# Patient Record
Sex: FEMALE | Race: WHITE | Employment: FULL TIME | URBAN - METROPOLITAN AREA
[De-identification: names, ages, dates, MRNs, and addresses within clinical notes are randomized per-mention and may not be internally consistent; named-entity substitution may affect disease eponyms.]

---

## 2023-06-13 DIAGNOSIS — F51.01 PRIMARY INSOMNIA: ICD-10-CM

## 2023-06-13 RX ORDER — ESZOPICLONE 3 MG/1
TABLET, FILM COATED ORAL
Qty: 30 TABLET | Refills: 0 | Status: SHIPPED | OUTPATIENT
Start: 2023-06-13 | End: 2023-07-14

## 2023-07-27 ENCOUNTER — OFFICE VISIT (OUTPATIENT)
Dept: OCCUPATIONAL MEDICINE | Age: 28
End: 2023-07-27

## 2023-07-27 VITALS
TEMPERATURE: 98 F | HEART RATE: 71 BPM | OXYGEN SATURATION: 100 % | SYSTOLIC BLOOD PRESSURE: 118 MMHG | DIASTOLIC BLOOD PRESSURE: 76 MMHG

## 2023-07-27 DIAGNOSIS — J32.9 BACTERIAL SINUSITIS: Primary | ICD-10-CM

## 2023-07-27 DIAGNOSIS — B96.89 BACTERIAL SINUSITIS: Primary | ICD-10-CM

## 2023-07-27 RX ORDER — AMOXICILLIN AND CLAVULANATE POTASSIUM 875; 125 MG/1; MG/1
1 TABLET, FILM COATED ORAL 2 TIMES DAILY
Qty: 14 TABLET | Refills: 0 | Status: SHIPPED | OUTPATIENT
Start: 2023-07-27 | End: 2023-08-03

## 2023-07-27 ASSESSMENT — ENCOUNTER SYMPTOMS
RHINORRHEA: 1
SORE THROAT: 1
SHORTNESS OF BREATH: 0
SINUS PRESSURE: 1
COUGH: 1
SINUS PAIN: 1

## 2023-08-15 ENCOUNTER — TELEPHONE (OUTPATIENT)
Dept: PRIMARY CARE CLINIC | Facility: CLINIC | Age: 28
End: 2023-08-15

## 2023-08-15 DIAGNOSIS — E55.9 VITAMIN D DEFICIENCY: ICD-10-CM

## 2023-08-15 DIAGNOSIS — F51.01 PRIMARY INSOMNIA: ICD-10-CM

## 2023-08-15 DIAGNOSIS — Z13.220 SCREENING FOR LIPID DISORDERS: ICD-10-CM

## 2023-08-15 DIAGNOSIS — R73.9 HYPERGLYCEMIA: ICD-10-CM

## 2023-08-15 DIAGNOSIS — Z79.899 ENCOUNTER FOR LONG-TERM (CURRENT) USE OF MEDICATIONS: Primary | ICD-10-CM

## 2023-08-15 DIAGNOSIS — Z83.49 FAMILY HISTORY OF THYROID DISEASE: ICD-10-CM

## 2023-08-15 NOTE — TELEPHONE ENCOUNTER
Please send one month supply of Lunesta to [facility] in [place] she is out of medication but appointment scheduled for 09/18/2023. Patient is scheduled for labs please order. Patient would also like thyroid panel because she has family history of thyroid disease.

## 2023-08-16 DIAGNOSIS — F51.01 PRIMARY INSOMNIA: ICD-10-CM

## 2023-08-16 RX ORDER — ESZOPICLONE 3 MG/1
3 TABLET, FILM COATED ORAL NIGHTLY
Qty: 30 TABLET | Refills: 0 | Status: SHIPPED | OUTPATIENT
Start: 2023-08-16 | End: 2023-09-15

## 2023-08-16 RX ORDER — ESZOPICLONE 3 MG/1
TABLET, FILM COATED ORAL
Qty: 30 TABLET | Refills: 0 | OUTPATIENT
Start: 2023-08-16

## 2023-08-22 DIAGNOSIS — Z83.49 FAMILY HISTORY OF THYROID DISEASE: ICD-10-CM

## 2023-08-22 DIAGNOSIS — Z79.899 ENCOUNTER FOR LONG-TERM (CURRENT) USE OF MEDICATIONS: ICD-10-CM

## 2023-08-22 DIAGNOSIS — Z13.220 SCREENING FOR LIPID DISORDERS: ICD-10-CM

## 2023-08-22 DIAGNOSIS — E55.9 VITAMIN D DEFICIENCY: ICD-10-CM

## 2023-08-22 DIAGNOSIS — R73.9 HYPERGLYCEMIA: ICD-10-CM

## 2023-08-22 LAB
25(OH)D3 SERPL-MCNC: 40.2 NG/ML (ref 30–100)
ALBUMIN SERPL-MCNC: 4 G/DL (ref 3.5–5)
ALBUMIN/GLOB SERPL: 1.3 (ref 0.4–1.6)
ALP SERPL-CCNC: 31 U/L (ref 50–136)
ALT SERPL-CCNC: 19 U/L (ref 12–65)
ANION GAP SERPL CALC-SCNC: 8 MMOL/L (ref 2–11)
AST SERPL-CCNC: 14 U/L (ref 15–37)
BASOPHILS # BLD: 0 K/UL (ref 0–0.2)
BASOPHILS NFR BLD: 1 % (ref 0–2)
BILIRUB SERPL-MCNC: 0.6 MG/DL (ref 0.2–1.1)
BUN SERPL-MCNC: 10 MG/DL (ref 6–23)
CALCIUM SERPL-MCNC: 9.1 MG/DL (ref 8.3–10.4)
CHLORIDE SERPL-SCNC: 111 MMOL/L (ref 101–110)
CHOLEST SERPL-MCNC: 135 MG/DL
CO2 SERPL-SCNC: 23 MMOL/L (ref 21–32)
CREAT SERPL-MCNC: 0.9 MG/DL (ref 0.6–1)
DIFFERENTIAL METHOD BLD: ABNORMAL
EOSINOPHIL # BLD: 0 K/UL (ref 0–0.8)
EOSINOPHIL NFR BLD: 1 % (ref 0.5–7.8)
ERYTHROCYTE [DISTWIDTH] IN BLOOD BY AUTOMATED COUNT: 12.9 % (ref 11.9–14.6)
GLOBULIN SER CALC-MCNC: 3 G/DL (ref 2.8–4.5)
GLUCOSE SERPL-MCNC: 93 MG/DL (ref 65–100)
HCT VFR BLD AUTO: 38.1 % (ref 35.8–46.3)
HDLC SERPL-MCNC: 75 MG/DL (ref 40–60)
HDLC SERPL: 1.8
HGB BLD-MCNC: 12.1 G/DL (ref 11.7–15.4)
IMM GRANULOCYTES # BLD AUTO: 0 K/UL (ref 0–0.5)
IMM GRANULOCYTES NFR BLD AUTO: 0 % (ref 0–5)
LDLC SERPL CALC-MCNC: 55.2 MG/DL
LYMPHOCYTES # BLD: 1.5 K/UL (ref 0.5–4.6)
LYMPHOCYTES NFR BLD: 33 % (ref 13–44)
MCH RBC QN AUTO: 30.4 PG (ref 26.1–32.9)
MCHC RBC AUTO-ENTMCNC: 31.8 G/DL (ref 31.4–35)
MCV RBC AUTO: 95.7 FL (ref 82–102)
MONOCYTES # BLD: 0.4 K/UL (ref 0.1–1.3)
MONOCYTES NFR BLD: 8 % (ref 4–12)
NEUTS SEG # BLD: 2.7 K/UL (ref 1.7–8.2)
NEUTS SEG NFR BLD: 57 % (ref 43–78)
NRBC # BLD: 0 K/UL (ref 0–0.2)
PLATELET # BLD AUTO: 246 K/UL (ref 150–450)
PMV BLD AUTO: 11 FL (ref 9.4–12.3)
POTASSIUM SERPL-SCNC: 3.9 MMOL/L (ref 3.5–5.1)
PROT SERPL-MCNC: 7 G/DL (ref 6.3–8.2)
RBC # BLD AUTO: 3.98 M/UL (ref 4.05–5.2)
SODIUM SERPL-SCNC: 142 MMOL/L (ref 133–143)
TRIGL SERPL-MCNC: 24 MG/DL (ref 35–150)
TSH W FREE THYROID IF ABNORMAL: 2.24 UIU/ML (ref 0.36–3.74)
VLDLC SERPL CALC-MCNC: 4.8 MG/DL (ref 6–23)
WBC # BLD AUTO: 4.7 K/UL (ref 4.3–11.1)

## 2023-08-23 LAB
EST. AVERAGE GLUCOSE BLD GHB EST-MCNC: 97 MG/DL
HBA1C MFR BLD: 5 % (ref 4.8–5.6)

## 2023-09-14 DIAGNOSIS — F51.01 PRIMARY INSOMNIA: ICD-10-CM

## 2023-09-14 RX ORDER — ESZOPICLONE 3 MG/1
3 TABLET, FILM COATED ORAL NIGHTLY
Qty: 30 TABLET | Refills: 0 | Status: SHIPPED | OUTPATIENT
Start: 2023-09-14 | End: 2023-10-14

## 2023-09-14 RX ORDER — ESZOPICLONE 3 MG/1
3 TABLET, FILM COATED ORAL NIGHTLY
Qty: 30 TABLET | Refills: 0 | OUTPATIENT
Start: 2023-09-14 | End: 2023-10-14

## 2023-10-03 ENCOUNTER — OFFICE VISIT (OUTPATIENT)
Dept: PRIMARY CARE CLINIC | Facility: CLINIC | Age: 28
End: 2023-10-03
Payer: COMMERCIAL

## 2023-10-03 VITALS
HEART RATE: 61 BPM | DIASTOLIC BLOOD PRESSURE: 81 MMHG | HEIGHT: 65 IN | BODY MASS INDEX: 16.98 KG/M2 | WEIGHT: 101.9 LBS | OXYGEN SATURATION: 100 % | TEMPERATURE: 98.7 F | SYSTOLIC BLOOD PRESSURE: 126 MMHG

## 2023-10-03 DIAGNOSIS — F51.01 PRIMARY INSOMNIA: Primary | ICD-10-CM

## 2023-10-03 DIAGNOSIS — Z79.899 ENCOUNTER FOR LONG-TERM (CURRENT) USE OF MEDICATIONS: ICD-10-CM

## 2023-10-03 DIAGNOSIS — F90.2 ATTENTION DEFICIT HYPERACTIVITY DISORDER (ADHD), COMBINED TYPE: ICD-10-CM

## 2023-10-03 PROCEDURE — 99213 OFFICE O/P EST LOW 20 MIN: CPT | Performed by: FAMILY MEDICINE

## 2023-10-03 RX ORDER — ESZOPICLONE 3 MG/1
3 TABLET, FILM COATED ORAL NIGHTLY
Qty: 30 TABLET | Refills: 5 | Status: SHIPPED | OUTPATIENT
Start: 2023-10-03 | End: 2023-11-02

## 2023-10-03 SDOH — ECONOMIC STABILITY: FOOD INSECURITY: WITHIN THE PAST 12 MONTHS, THE FOOD YOU BOUGHT JUST DIDN'T LAST AND YOU DIDN'T HAVE MONEY TO GET MORE.: NEVER TRUE

## 2023-10-03 SDOH — ECONOMIC STABILITY: HOUSING INSECURITY
IN THE LAST 12 MONTHS, WAS THERE A TIME WHEN YOU DID NOT HAVE A STEADY PLACE TO SLEEP OR SLEPT IN A SHELTER (INCLUDING NOW)?: PATIENT REFUSED

## 2023-10-03 SDOH — ECONOMIC STABILITY: INCOME INSECURITY: HOW HARD IS IT FOR YOU TO PAY FOR THE VERY BASICS LIKE FOOD, HOUSING, MEDICAL CARE, AND HEATING?: NOT VERY HARD

## 2023-10-03 SDOH — ECONOMIC STABILITY: FOOD INSECURITY: WITHIN THE PAST 12 MONTHS, YOU WORRIED THAT YOUR FOOD WOULD RUN OUT BEFORE YOU GOT MONEY TO BUY MORE.: NEVER TRUE

## 2023-10-03 ASSESSMENT — PATIENT HEALTH QUESTIONNAIRE - PHQ9
SUM OF ALL RESPONSES TO PHQ QUESTIONS 1-9: 0
SUM OF ALL RESPONSES TO PHQ9 QUESTIONS 1 & 2: 0
SUM OF ALL RESPONSES TO PHQ QUESTIONS 1-9: 0
SUM OF ALL RESPONSES TO PHQ QUESTIONS 1-9: 0
1. LITTLE INTEREST OR PLEASURE IN DOING THINGS: 0
SUM OF ALL RESPONSES TO PHQ QUESTIONS 1-9: 0
2. FEELING DOWN, DEPRESSED OR HOPELESS: 0

## 2023-10-03 NOTE — PROGRESS NOTES
08/22/2023 AUTOMATED    Final    Neutrophils % 08/22/2023 57  43 - 78 % Final    Lymphocytes % 08/22/2023 33  13 - 44 % Final    Monocytes % 08/22/2023 8  4.0 - 12.0 % Final    Eosinophils % 08/22/2023 1  0.5 - 7.8 % Final    Basophils % 08/22/2023 1  0.0 - 2.0 % Final    Immature Granulocytes 08/22/2023 0  0.0 - 5.0 % Final    Neutrophils Absolute 08/22/2023 2.7  1.7 - 8.2 K/UL Final    Lymphocytes Absolute 08/22/2023 1.5  0.5 - 4.6 K/UL Final    Monocytes Absolute 08/22/2023 0.4  0.1 - 1.3 K/UL Final    Eosinophils Absolute 08/22/2023 0.0  0.0 - 0.8 K/UL Final    Basophils Absolute 08/22/2023 0.0  0.0 - 0.2 K/UL Final    Absolute Immature Granulocyte 08/22/2023 0.0  0.0 - 0.5 K/UL Final       IMPRESSION/PLAN     Diagnosis Orders   1. Primary insomnia  eszopiclone (LUNESTA) 3 MG TABS      2. Attention deficit hyperactivity disorder (ADHD), combined type        3. Encounter for long-term (current) use of medications            Follow up and Dispositions:  Return in about 6 months (around 4/3/2024) for [address] [place] [address]. Patient is stable on medications and will continue current medications. Refilled the above medications. Reviewed medications and side effects in detail. Will check labs before next visit. Reviewed most recent labs. Reviewed diet, exercise and weight control. Damián Ramirez MD    Dictated using voice recognition software.  Proofread, but unrecognized voice recognition errors may exist.

## 2023-11-17 ENCOUNTER — CLINICAL DOCUMENTATION (OUTPATIENT)
Dept: PRIMARY CARE CLINIC | Facility: CLINIC | Age: 28
End: 2023-11-17

## 2023-11-17 DIAGNOSIS — F90.2 ATTENTION DEFICIT HYPERACTIVITY DISORDER (ADHD), COMBINED TYPE: Primary | ICD-10-CM

## 2023-11-17 RX ORDER — ATOMOXETINE 80 MG/1
80 CAPSULE ORAL DAILY
Qty: 30 CAPSULE | Refills: 5 | Status: SHIPPED | OUTPATIENT
Start: 2023-11-17

## 2023-11-17 NOTE — PROGRESS NOTES
GeneSight Testing results showed Zoloft has moderate reduced efficacy. She should do well on Pristiq, Venlafaxine, Effexor, Viibryd or Trintellix. Strattera, Vyvanse should work. They do not have any genetic markers. Will send Strattera to the pharmacy.

## 2023-11-20 ENCOUNTER — TELEPHONE (OUTPATIENT)
Dept: PRIMARY CARE CLINIC | Facility: CLINIC | Age: 28
End: 2023-11-20

## 2023-11-20 NOTE — TELEPHONE ENCOUNTER
----- Message from Jorge Gilford, MD sent at 2023  5:17 PM EST -----  [facility] [address] results showed Zoloft has moderate reduced efficacy. She should do well on Pristiq, Venlafaxine, Effexor, Viibryd or Trintellix. Strattera, Vyvanse should work. They do not have any genetic markers. Will send Strattera to the pharmacy.

## 2024-04-02 ENCOUNTER — NURSE ONLY (OUTPATIENT)
Dept: PRIMARY CARE CLINIC | Facility: CLINIC | Age: 29
End: 2024-04-02

## 2024-04-02 DIAGNOSIS — E55.9 VITAMIN D DEFICIENCY: ICD-10-CM

## 2024-04-02 DIAGNOSIS — Z79.899 ENCOUNTER FOR LONG-TERM (CURRENT) USE OF MEDICATIONS: Primary | ICD-10-CM

## 2024-04-09 ENCOUNTER — OFFICE VISIT (OUTPATIENT)
Dept: PRIMARY CARE CLINIC | Facility: CLINIC | Age: 29
End: 2024-04-09
Payer: COMMERCIAL

## 2024-04-09 VITALS
OXYGEN SATURATION: 100 % | DIASTOLIC BLOOD PRESSURE: 88 MMHG | TEMPERATURE: 97.4 F | WEIGHT: 99.5 LBS | BODY MASS INDEX: 16.58 KG/M2 | SYSTOLIC BLOOD PRESSURE: 114 MMHG | HEART RATE: 72 BPM | HEIGHT: 65 IN

## 2024-04-09 DIAGNOSIS — F90.2 ATTENTION DEFICIT HYPERACTIVITY DISORDER (ADHD), COMBINED TYPE: ICD-10-CM

## 2024-04-09 DIAGNOSIS — F51.01 PRIMARY INSOMNIA: Primary | ICD-10-CM

## 2024-04-09 DIAGNOSIS — K21.9 GASTROESOPHAGEAL REFLUX DISEASE WITHOUT ESOPHAGITIS: ICD-10-CM

## 2024-04-09 DIAGNOSIS — Z79.899 ENCOUNTER FOR LONG-TERM (CURRENT) USE OF MEDICATIONS: ICD-10-CM

## 2024-04-09 PROCEDURE — 99213 OFFICE O/P EST LOW 20 MIN: CPT | Performed by: FAMILY MEDICINE

## 2024-04-09 RX ORDER — DEXTROAMPHETAMINE SACCHARATE, AMPHETAMINE ASPARTATE, DEXTROAMPHETAMINE SULFATE AND AMPHETAMINE SULFATE 1.25; 1.25; 1.25; 1.25 MG/1; MG/1; MG/1; MG/1
5 TABLET ORAL DAILY
Qty: 30 TABLET | Refills: 0 | Status: SHIPPED | OUTPATIENT
Start: 2024-06-08 | End: 2024-07-08

## 2024-04-09 RX ORDER — PANTOPRAZOLE SODIUM 40 MG/1
40 TABLET, DELAYED RELEASE ORAL DAILY
Qty: 90 TABLET | Refills: 3 | Status: SHIPPED | OUTPATIENT
Start: 2024-04-09

## 2024-04-09 RX ORDER — DEXTROAMPHETAMINE SACCHARATE, AMPHETAMINE ASPARTATE, DEXTROAMPHETAMINE SULFATE AND AMPHETAMINE SULFATE 1.25; 1.25; 1.25; 1.25 MG/1; MG/1; MG/1; MG/1
5 TABLET ORAL DAILY
Qty: 30 TABLET | Refills: 0 | Status: SHIPPED | OUTPATIENT
Start: 2024-05-09 | End: 2024-06-08

## 2024-04-09 RX ORDER — ESZOPICLONE 3 MG/1
3 TABLET, FILM COATED ORAL NIGHTLY
Qty: 30 TABLET | Refills: 5 | Status: SHIPPED | OUTPATIENT
Start: 2024-04-09 | End: 2024-05-09

## 2024-04-09 RX ORDER — DEXTROAMPHETAMINE SACCHARATE, AMPHETAMINE ASPARTATE, DEXTROAMPHETAMINE SULFATE AND AMPHETAMINE SULFATE 1.25; 1.25; 1.25; 1.25 MG/1; MG/1; MG/1; MG/1
5 TABLET ORAL DAILY
Qty: 30 TABLET | Refills: 0 | Status: SHIPPED | OUTPATIENT
Start: 2024-04-09 | End: 2024-05-09

## 2024-04-09 RX ORDER — ESZOPICLONE 3 MG/1
3 TABLET, FILM COATED ORAL NIGHTLY
COMMUNITY
Start: 2024-03-15 | End: 2024-04-09 | Stop reason: SDUPTHER

## 2024-04-09 SDOH — ECONOMIC STABILITY: INCOME INSECURITY: HOW HARD IS IT FOR YOU TO PAY FOR THE VERY BASICS LIKE FOOD, HOUSING, MEDICAL CARE, AND HEATING?: NOT VERY HARD

## 2024-04-09 SDOH — ECONOMIC STABILITY: HOUSING INSECURITY
IN THE LAST 12 MONTHS, WAS THERE A TIME WHEN YOU DID NOT HAVE A STEADY PLACE TO SLEEP OR SLEPT IN A SHELTER (INCLUDING NOW)?: NO

## 2024-04-09 SDOH — ECONOMIC STABILITY: FOOD INSECURITY: WITHIN THE PAST 12 MONTHS, THE FOOD YOU BOUGHT JUST DIDN'T LAST AND YOU DIDN'T HAVE MONEY TO GET MORE.: NEVER TRUE

## 2024-04-09 SDOH — ECONOMIC STABILITY: FOOD INSECURITY: WITHIN THE PAST 12 MONTHS, YOU WORRIED THAT YOUR FOOD WOULD RUN OUT BEFORE YOU GOT MONEY TO BUY MORE.: NEVER TRUE

## 2024-04-09 ASSESSMENT — PATIENT HEALTH QUESTIONNAIRE - PHQ9
SUM OF ALL RESPONSES TO PHQ QUESTIONS 1-9: 1
SUM OF ALL RESPONSES TO PHQ QUESTIONS 1-9: 1
2. FEELING DOWN, DEPRESSED OR HOPELESS: SEVERAL DAYS
SUM OF ALL RESPONSES TO PHQ QUESTIONS 1-9: 1
SUM OF ALL RESPONSES TO PHQ QUESTIONS 1-9: 1
SUM OF ALL RESPONSES TO PHQ9 QUESTIONS 1 & 2: 1
1. LITTLE INTEREST OR PLEASURE IN DOING THINGS: NOT AT ALL

## 2024-04-09 NOTE — PROGRESS NOTES
[facility]  Gricel Torre M.D.  [address] [place] [id number]  Phone:  [phone]  Fax:  [phone]    CHIEF COMPLAINT:  Chief Complaint   Patient presents with    Gastroesophageal Reflux     Patient states her acid reflux has been getting bad again. She would like to re-start the Pantoprazole. She has been off of it for about a year.     ADD     Patient states she has not been on any ADD medication for awhile. She did not like the side effects of Vyvanse. Strattera caused nausea. She had a genesite testing done, which indicates she should be able to take Guanfacine or Adderall.         HISTORY OF PRESENT ILLNESS:  Ms. Palomo is a 29 y.o. female  who presents for follow-up.  Patient states that she has been doing well.  She tried taking the Strattera but it caused severe nausea.  She did well with the Vyvanse 10 mg but it was causing her to lose weight and it caused her heart rate to increase.  She was able to focus with the Vyvanse.  She did the GeneSight testing and it shows the Adderall and Intuniv should work for her.  She would like to try the lowest dose of Adderall to see if that will help her focus.  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  She has to get up very early and is there almost 10 hours each day.  She has trouble focusing on her job.    Her reflux is slightly improved but she does have flareups occasionally when she eats spicy foods.  She would like a refill on the Protonix.  She was taking it twice daily but thinks she can do it only once daily.    No other complaints. Taking medications as prescribed. Medications reviewed and updated.     HISTORY:  No Known Allergies    REVIEW OF SYSTEMS:  Review of systems is as indicated in HPI otherwise negative.    PHYSICAL EXAM:  Visit Vitals  /88   Pulse 72   Temp 97.4 °F (36.3 °C) (Temporal)   Ht 1.651 m (5' 5\")   Wt 45.1 kg (99 lb 8 oz)   SpO2 100%   BMI 16.56 kg/m²        Physical Exam  Vitals

## 2024-05-13 ENCOUNTER — PATIENT MESSAGE (OUTPATIENT)
Dept: PRIMARY CARE CLINIC | Facility: CLINIC | Age: 29
End: 2024-05-13

## 2024-05-13 DIAGNOSIS — F90.2 ATTENTION DEFICIT HYPERACTIVITY DISORDER (ADHD), COMBINED TYPE: ICD-10-CM

## 2024-05-13 DIAGNOSIS — K21.9 GASTROESOPHAGEAL REFLUX DISEASE WITHOUT ESOPHAGITIS: ICD-10-CM

## 2024-05-14 RX ORDER — DEXTROAMPHETAMINE SACCHARATE, AMPHETAMINE ASPARTATE, DEXTROAMPHETAMINE SULFATE AND AMPHETAMINE SULFATE 1.25; 1.25; 1.25; 1.25 MG/1; MG/1; MG/1; MG/1
5 TABLET ORAL 2 TIMES DAILY
Qty: 60 TABLET | Refills: 0 | Status: CANCELLED | OUTPATIENT
Start: 2024-05-14 | End: 2024-06-13

## 2024-05-14 NOTE — TELEPHONE ENCOUNTER
From: Asia Palomo  To: Dr. Gricel Torre  Sent: 5/13/2024 10:08 PM EDT  Subject: Medication Refill     Hey Dr. Torre,    I wanted to reach out because at my last visit you started me on Adderall 5mg to try and told me to send a message to refill if it worked well for me. I definitely thought this worked much better than the other medications we tried for me and I had a lot less side effects than the others so I’d love to refill. We did discuss the possibility of me needing to increase the dosage slightly and I agree that I think maybe 10mg would be the perfect amount. However, because I am trying so hard to not curb my appetite I was wondering if it was possible to do 5mg twice a day so I can spread it out to have less appetite effects? If that is not an option is the 10mg an extended release so it won’t be so strong all at once?    Also can I increase my Pantoprazole back to twice daily? I thought once a day would be enough but I’ve had a lot of reflux lately!     Thank you!    Asia Palomo

## 2024-05-15 RX ORDER — PANTOPRAZOLE SODIUM 40 MG/1
40 TABLET, DELAYED RELEASE ORAL 2 TIMES DAILY
Qty: 180 TABLET | Refills: 3 | Status: SHIPPED | OUTPATIENT
Start: 2024-05-15

## 2024-07-12 DIAGNOSIS — F90.2 ATTENTION DEFICIT HYPERACTIVITY DISORDER (ADHD), COMBINED TYPE: ICD-10-CM

## 2024-07-12 RX ORDER — DEXTROAMPHETAMINE SACCHARATE, AMPHETAMINE ASPARTATE, DEXTROAMPHETAMINE SULFATE AND AMPHETAMINE SULFATE 1.25; 1.25; 1.25; 1.25 MG/1; MG/1; MG/1; MG/1
5 TABLET ORAL DAILY
Qty: 30 TABLET | Refills: 0 | Status: SHIPPED | OUTPATIENT
Start: 2024-07-12 | End: 2024-08-11

## 2024-08-15 DIAGNOSIS — F90.2 ATTENTION DEFICIT HYPERACTIVITY DISORDER (ADHD), COMBINED TYPE: ICD-10-CM

## 2024-08-15 RX ORDER — DEXTROAMPHETAMINE SACCHARATE, AMPHETAMINE ASPARTATE, DEXTROAMPHETAMINE SULFATE AND AMPHETAMINE SULFATE 1.25; 1.25; 1.25; 1.25 MG/1; MG/1; MG/1; MG/1
5 TABLET ORAL DAILY
Qty: 30 TABLET | Refills: 0 | Status: SHIPPED | OUTPATIENT
Start: 2024-08-15 | End: 2024-09-14